# Patient Record
Sex: MALE | Race: WHITE | Employment: UNEMPLOYED | URBAN - METROPOLITAN AREA
[De-identification: names, ages, dates, MRNs, and addresses within clinical notes are randomized per-mention and may not be internally consistent; named-entity substitution may affect disease eponyms.]

---

## 2021-02-02 ENCOUNTER — HOSPITAL ENCOUNTER (OUTPATIENT)
Dept: LAB | Age: 41
Discharge: HOME | End: 2021-02-02
Payer: COMMERCIAL

## 2021-02-02 LAB — POTASSIUM SERPL-SCNC: 3.6 MMOL/L (ref 3.5–5.5)

## 2021-02-02 PROCEDURE — 84132 ASSAY OF SERUM POTASSIUM: CPT

## 2021-02-02 PROCEDURE — 36415 COLL VENOUS BLD VENIPUNCTURE: CPT

## 2022-04-18 ENCOUNTER — HOSPITAL ENCOUNTER (OUTPATIENT)
Dept: LAB | Age: 42
Discharge: HOME | End: 2022-04-18

## 2022-04-18 ENCOUNTER — OFFICE VISIT (OUTPATIENT)
Dept: HEMATOLOGY | Age: 42
End: 2022-04-18

## 2022-04-18 VITALS
HEIGHT: 74 IN | OXYGEN SATURATION: 99 % | SYSTOLIC BLOOD PRESSURE: 152 MMHG | WEIGHT: 224 LBS | TEMPERATURE: 97.1 F | BODY MASS INDEX: 28.75 KG/M2 | DIASTOLIC BLOOD PRESSURE: 103 MMHG

## 2022-04-18 DIAGNOSIS — K70.9 ALCOHOLIC LIVER DISEASE (HCC): Primary | ICD-10-CM

## 2022-04-18 PROBLEM — I10 HYPERTENSION: Status: ACTIVE | Noted: 2022-04-18

## 2022-04-18 PROBLEM — M10.9 GOUT: Status: ACTIVE | Noted: 2022-04-18

## 2022-04-18 LAB
ETHANOL SERPL-MCNC: <3 MG/DL (ref 0–3)
SENTARA SPECIMEN COL,SENBCF: NORMAL

## 2022-04-18 PROCEDURE — 99203 OFFICE O/P NEW LOW 30 MIN: CPT | Performed by: INTERNAL MEDICINE

## 2022-04-18 PROCEDURE — 82077 ASSAY SPEC XCP UR&BREATH IA: CPT

## 2022-04-18 PROCEDURE — 99001 SPECIMEN HANDLING PT-LAB: CPT

## 2022-04-18 RX ORDER — AMLODIPINE BESYLATE 5 MG/1
5 TABLET ORAL DAILY
COMMUNITY

## 2022-04-18 RX ORDER — LISINOPRIL 10 MG/1
10 TABLET ORAL DAILY
COMMUNITY

## 2022-04-18 RX ORDER — DISULFIRAM 250 MG/1
TABLET ORAL
COMMUNITY
Start: 2022-03-30

## 2022-04-18 RX ORDER — COLCHICINE 0.6 MG/1
0.6 TABLET ORAL DAILY
COMMUNITY
End: 2022-04-18

## 2022-04-18 NOTE — Clinical Note
5/7/2022    Patient: Krish Morel   YOB: 1980   Date of Visit: 4/18/2022     Jona Quiroga MD  [facility] 1 [phone]  Via Fax: [phone]     [address] [place] [address]  Via     Dear Jona Quiroga MD  [address] [place] [address],      Thank you for referring Mr. Krish Morel to [address] [place] [address],[address] for evaluation. My notes for this consultation are attached. If you have questions, please do not hesitate to call me. I look forward to following your patient along with you.       Sincerely,    Timmy Simpson MD

## 2022-04-18 NOTE — PROGRESS NOTES
[address] [facility], MD, [address], [place], [place]      Leti Jones, RAJNI Pandey, ACNP-BC     Lainey Calderon, [facility]   GILBERT Link, [facility]       Poonam Hicks [address]    at [address], [address] [place] [address]    [address] [facility] 22. [phone]    FAX: [address] [place] [address]    at [address], [address]    [phone]    FAX: [phone]       Patient Care Team:  Mendy Willson MD as PCP - General (Family Medicine)      Problem List  Date Reviewed: 4/18/2022          Codes Class Noted    Alcoholic liver disease [facility]) ICD-10-CM: K70.9  ICD-9-CM: 571.3  4/18/2022        Hypertension ICD-10-CM: I10  ICD-9-CM: 401.9  4/18/2022        Gout ICD-10-CM: M10.9  ICD-9-CM: 274.9  4/18/2022              The clinicians listed above have asked me to see Tc Josephamara in consultation regarding elevated liver enzymes and its management. All medical records sent by the referring physicians were reviewed including imaging studies     The patient is a 38 y.o.  male who went to the ED when he developed jaundice in 3/2022. TBILI at that time 16. There was a previous visit to the ED in 9/2021 for severe dehydration due to alcohol. TBILI at that time was normal.    The patient had consumed alcohol in excess. The patient has been abstinent from all alcoholic beverages since 0/2324. Serologic evaluation for markers of chronic liver disease was negative for HCV, HBV,     Ultrasound of the liver was performed in 3/2022. The results of the imaging suggested fatty liver disease. The patient does not have any symptoms which could be attributed to the liver disorder. All fatigue has resolved,   He feels better than he hs in over 1 year. Sleeping better. Moved into [address] [place], [place] [address]way [address] and is going to alcohol counseling. The patient is not experiencing the following symptoms which are commonly seen in this liver disorder:   problems concentrating,   swelling of the abdomen,   swelling of the lower extremities,   hematemesis,   hematochezia. The patient completes all daily activities without any functional limitations. ASSESSMENT AND PLAN:  Elevated liver enzymes  Intermittent elevation in liver transaminases that appers to be due to alcohol. Have performed laboratory testing to monitor liver function and degree of liver injury. This included BMP, hepatic panel, CBC with platelet count, INR. Liver transaminases are now normal.  ALP is normal.  Liver function is normal.  The platelet count is normal.      Based upon laboratory studies and imaging the patient does not appear to have advanced liver disease. Serologic testing for causes of chronic liver disease was negative for HCV, HBV     The most likely causes for the liver chemistry abnormalities were discussed with the patient and include alcoholic liver disease. The need to perform an assessment of liver fibrosis was discussed with the patient. The Fibroscan can assess liver fibrosis and determine if a patient has advanced fibrosis or cirrhosis without the need for liver biopsy. This will be performed at the next office visit. If the Fibroscan suggests advanced fibrosis then a liver biopsy should be considered. The Fibroscan can be repeated annually or as often as clinically indicated to assess for fibrosis progression and/or regression. Screening for Hepatocellular Carcinoma  HCC screening is not necessary if the patient has no evidence of cirrhosis. Treatment of other medical problems in patients with chronic liver disease  There are no contraindications for the patient to take most medications that are necessary for treatment of other medical issues.     The patient has recently stopped consuming alcohol. Regular alcohol use increases the risk of toxicity from acetaminophen. This analgesic should be avoided until the patient has been abstinent from alcohol for 6 months. Counseling for alcohol in patients with chronic liver disease  The patient was counseled regarding alcohol consumption and the effect of alcohol on chronic liver disease. The patient has not consumed alcohol since 3/2022    It was recommended that all alcohol consumption be stopped and the patient be abstinent from alcohol for 6 months. If FIbroscan is normal he can resume alcohol but only if he can keep this to a noraml amount which is 0-2 per day. Alcohol should not be consumed every day. Vaccinations   Since the patient does not appear to have a chronic liver disease which can lead to liver injury screening for HAV and HBV is not needed. Routine vaccinations against other bacterial and viral agents can be performed as indicated. Annual flu vaccination should be administered if indicated. ALLERGIES  Not on File    MEDICATIONS  Current Outpatient Medications   Medication Sig    lisinopriL (PRINIVIL, ZESTRIL) 10 mg tablet Take 10 mg by mouth daily.  disulfiram (ANTABUSE) 250 mg tablet     amLODIPine (NORVASC) 5 mg tablet Take 5 mg by mouth daily.  ALLOPURINOL PO Take  by mouth daily. No current facility-administered medications for this visit. SYSTEM REVIEW NOT RELATED TO LIVER DISEASE OR REVIEWED ABOVE:  Constitution systems: Negative for fever, chills, weight gain, weight loss. Eyes: Negative for visual changes. ENT: Negative for sore throat, painful swallowing. Respiratory: Negative for cough, hemoptysis, SOB. Cardiology: Negative for chest pain, palpitations. GI:  Negative for constipation or diarrhea. : Negative for urinary frequency, dysuria, hematuria, nocturia. Skin: Negative for rash. Hematology: Negative for easy bruising, blood clots. Musculo-skelatal: Negative for back pain, muscle pain, weakness. Neurologic: Negative for headaches, dizziness, vertigo, memory problems not related to HE. Psychology: Negative for anxiety, depression. FAMILY HISTORY:  The father Has/had the following following chronic disease(s): None. The mother Has/had the following chronic disease(s): None. There is no family history of liver disease. SOCIAL HISTORY:  The patient has never been . The patient has no children. The patient has never used tobacco products. The patient was has previously consumed alcohol in excess. The patient has been abstinent from alcohol since 3/2022. 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 PHYSICAL EXAMINATION:  Visit Vitals  BP (!) 152/103   Temp 97.1 °F (36.2 °C) (Tympanic)   Ht 6' 2\" (1.88 m)   Wt 224 lb (101.6 kg)   SpO2 99%   BMI 28.76 kg/m²     General: No acute distress. Eyes: Sclera anicteric. ENT: No oral lesions. Thyroid normal.  Nodes: No adenopathy. Skin: No spider angiomata. No jaundice. No palmar erythema. Respiratory: Lungs clear to auscultation. Cardiovascular: Regular heart rate. No murmurs. No JVD. Abdomen: Soft non-tender. Liver size normal to percussion/palpation. Spleen not palpable. No obvious ascites. Extremities: No edema. No muscle wasting. No gross arthritic changes. Neurologic: Alert and oriented. Cranial nerves grossly intact. No asterixis.     LABORATORY STUDIES:  Liver [place] of [address] [place] [address] 4/18/2022   WBC 4.0 - 11.0 K/uL 9.2   ANC 1.8 - 7.7 K/uL 6.3   HGB 13.2 - 17.3 g/dL 14.4    - 440 K/uL 322   INR 0.89 - 1.29 0.92   AST 10 - 37 U/L 16   ALT 5 - 40 U/L 28   Alk Phos 25 - 115 U/L 77   Bili, Total 0.2 - 1.2 mg/dL 0.5   Bili, Direct 0.0 - 0.3 mg/dL <0.2   Albumin 3.5 - 5.0 g/dL 4.7   BUN 6 - 22 mg/dL 17   Creat 0.5 - 1.2 mg/dL 0.8   Na 133 - 145 mmol/L 136   K 3.5 - 5.5 mmol/L 4.1   Cl 98 - 110 mmol/L 100   CO2 20 - 32 mmol/L 23   Glucose 70 - 99 mg/dL 93     SEROLOGIES:  Not available or performed. Testing was performed today. LIVER HISTOLOGY:  Not available or performed    ENDOSCOPIC PROCEDURES:  Not available or performed    RADIOLOGY:  3/2022. Ultrasound of liver. Echogenic consistent with fatty liver. No liver mass lesions. No dilated bile ducts. No ascites. OTHER TESTING:  Not available or performed    FOLLOW-UP:  All of the issues listed above in the Assessment and Plan were discussed with the patient. All questions were answered. The patient expressed a clear understanding of the above. [facility] [place] [address] in 4 weeks for Fibroscan to review all data and determine the treatment plan.       Tad Jaimes MD  [address]  [address] [place] [address], [address] [place] [address], [address]  12 [facility]

## 2022-04-19 LAB
A-G RATIO,AGRAT: 2 RATIO (ref 1.1–2.6)
ABSOLUTE LYMPHOCYTE COUNT, 10803: 2 K/UL (ref 1–4.8)
ALBUMIN SERPL-MCNC: 4.7 G/DL (ref 3.5–5)
ALP SERPL-CCNC: 77 U/L (ref 25–115)
ALT SERPL-CCNC: 28 U/L (ref 5–40)
ANION GAP SERPL CALC-SCNC: 13 MMOL/L (ref 3–15)
AST SERPL W P-5'-P-CCNC: 16 U/L (ref 10–37)
BASOPHILS # BLD: 0.1 K/UL (ref 0–0.2)
BASOPHILS NFR BLD: 1 % (ref 0–2)
BILIRUB SERPL-MCNC: 0.5 MG/DL (ref 0.2–1.2)
BILIRUBIN, DIRECT,CBIL: <0.2 MG/DL (ref 0–0.3)
BUN SERPL-MCNC: 17 MG/DL (ref 6–22)
CALCIUM SERPL-MCNC: 9.3 MG/DL (ref 8.4–10.5)
CHLORIDE SERPL-SCNC: 100 MMOL/L (ref 98–110)
CO2 SERPL-SCNC: 23 MMOL/L (ref 20–32)
CREAT SERPL-MCNC: 0.8 MG/DL (ref 0.5–1.2)
EOSINOPHIL # BLD: 0.2 K/UL (ref 0–0.5)
EOSINOPHIL NFR BLD: 2 % (ref 0–6)
ERYTHROCYTE [DISTWIDTH] IN BLOOD BY AUTOMATED COUNT: 14.6 % (ref 10–15.5)
GFRAA, 66117: >60
GFRNA, 66118: >60
GLOBULIN,GLOB: 2.4 G/DL (ref 2–4)
GLUCOSE SERPL-MCNC: 93 MG/DL (ref 70–99)
GRANULOCYTES,GRANS: 68 % (ref 40–75)
HCT VFR BLD AUTO: 45.4 % (ref 36.6–51.9)
HGB BLD-MCNC: 14.4 G/DL (ref 13.2–17.3)
INR PPP: 0.92 (ref 0.89–1.29)
LYMPHOCYTES, LYMLT: 21 % (ref 20–45)
MCH RBC QN AUTO: 31 PG (ref 26–34)
MCHC RBC AUTO-ENTMCNC: 32 G/DL (ref 31–36)
MCV RBC AUTO: 99 FL (ref 80–95)
MONOCYTES # BLD: 0.6 K/UL (ref 0.1–1)
MONOCYTES NFR BLD: 7 % (ref 3–12)
NEUTROPHILS # BLD AUTO: 6.3 K/UL (ref 1.8–7.7)
PLATELET # BLD AUTO: 322 K/UL (ref 140–440)
PMV BLD AUTO: 10.8 FL (ref 9–13)
POTASSIUM SERPL-SCNC: 4.1 MMOL/L (ref 3.5–5.5)
PROT SERPL-MCNC: 7.1 G/DL (ref 6.4–8.3)
PROTHROMBIN TIME: 10.1 SEC (ref 9–13)
RBC # BLD AUTO: 4.59 M/UL (ref 3.8–5.8)
SODIUM SERPL-SCNC: 136 MMOL/L (ref 133–145)
WBC # BLD AUTO: 9.2 K/UL (ref 4–11)

## 2022-05-16 ENCOUNTER — OFFICE VISIT (OUTPATIENT)
Dept: HEMATOLOGY | Age: 42
End: 2022-05-16
Payer: COMMERCIAL

## 2022-05-16 VITALS
SYSTOLIC BLOOD PRESSURE: 122 MMHG | OXYGEN SATURATION: 98 % | RESPIRATION RATE: 20 BRPM | HEIGHT: 74 IN | WEIGHT: 220 LBS | HEART RATE: 82 BPM | TEMPERATURE: 97.3 F | BODY MASS INDEX: 28.23 KG/M2 | DIASTOLIC BLOOD PRESSURE: 80 MMHG

## 2022-05-16 DIAGNOSIS — K70.9 ALCOHOLIC LIVER DISEASE (HCC): Primary | ICD-10-CM

## 2022-05-16 PROCEDURE — 99214 OFFICE O/P EST MOD 30 MIN: CPT | Performed by: NURSE PRACTITIONER

## 2022-05-16 PROCEDURE — 91200 LIVER ELASTOGRAPHY: CPT | Performed by: NURSE PRACTITIONER

## 2022-05-16 NOTE — PROGRESS NOTES
[address] [facility] [place], [place], Prieto Ortega, Onesimo Toño [facility], [place]      RAJNI Juan, [facility]     April S Yumiko, [facility]   GILBERT Cortes, [facility]       Poonam Hicks [address]    at [facility]    [address] [facility], [address] [facility], [facility].    [phone]    FAX: [address]    [address], [address], [address]    [phone]    FAX: [phone]       Patient Care Team:  Tino Torres MD as PCP - General (Family Medicine)      Problem List  Date Reviewed: 5/16/2022          Codes Class Noted    Alcoholic liver disease [facility]) ICD-10-CM: K70.9  ICD-9-CM: 571.3  4/18/2022        Hypertension ICD-10-CM: I10  ICD-9-CM: 401.9  4/18/2022        Gout ICD-10-CM: M10.9  ICD-9-CM: 274.9  4/18/2022              Jatinder Cantu returns to the [place] [address] today for fibroscan assessment of hepatic fibrosis and for education and management of alcoholic liver disease. The active problem list, all pertinent past medical history, medications, liver histology, endoscopic studies, radiologic findings and laboratory findings related to the liver disorder were reviewed with the patient. The patient is a 38 y.o.  male who went to the ED when he developed jaundice in 3/2022. TBILI at that time 16. There was a previous visit to the ED in 9/2021 for severe dehydration due to alcohol. TBILI at that time was normal.    The patient had consumed alcohol in excess. The patient has been abstinent from all alcoholic beverages since 6/5462. Serologic evaluation for markers of chronic liver disease was negative for HCV, HBV,     Ultrasound of the liver was performed in 3/2022. The results of the imaging suggested fatty liver disease.       The patient has no complaints which can be attributed to liver disease. The patient completes all daily activities without any functional limitations. The patient has not experienced fatigue, fevers, chills, shortness of breath, chest pain, pain in the right side over the liver, diffuse abdominal pain, nausea, vomiting, constipation, diarrhrea, dry eyes, dry mouth, arthralgias, myalgias, yellowing of the eyes or skin, itching, dark urine, problems concentrating, swelling of the abdomen, swelling of the lower extremities, hematemesis, or hematochezia. Since the last office appointment:  Still no alcohol since 03/2022. ASSESSMENT AND PLAN:  Elevated liver enzymes  Intermittent elevation in liver transaminases that appers to be due to alcohol. The most recent laboratory studies indicate that the liver transaminases are normal, alkaline phosphatase is normal, tests of hepatic synthetic and metabolic function are normal, and the platelet count is normal.      Based upon laboratory studies, imaging, and fibroscan assessment, the patient does not appear to have advanced liver disease. Serologic testing for causes of chronic liver disease was negative for HCV, HBV     The most likely causes for the liver chemistry abnormalities were discussed with the patient and include alcoholic liver disease. The need to perform an assessment of liver fibrosis was discussed with the patient. The Fibroscan can assess liver fibrosis and determine if a patient has advanced fibrosis or cirrhosis without the need for liver biopsy. This was performed during this appointment. Results of the scan indicate normal liver stiffness with a suggested Metavir fibrosis score of F0. The scan also suggest that he has a fatty liver. If the Fibroscan suggests advanced fibrosis then a liver biopsy should be considered.       The Fibroscan can be repeated annually or as often as clinically indicated to assess for fibrosis progression and/or regression. Screening for Hepatocellular Carcinoma  HCC screening is not necessary if the patient has no evidence of cirrhosis. Treatment of other medical problems in patients with chronic liver disease  There are no contraindications for the patient to take most medications that are necessary for treatment of other medical issues. The patient has recently stopped consuming alcohol. Regular alcohol use increases the risk of toxicity from acetaminophen. This analgesic should be avoided until the patient has been abstinent from alcohol for 6 months. Counseling for alcohol in patients with chronic liver disease  The patient was counseled regarding alcohol consumption and the effect of alcohol on chronic liver disease. The patient has not consumed alcohol since 3/2022    It was recommended that all alcohol consumption be stopped and the patient be abstinent from alcohol for 6 months. If FIbroscan is normal he can resume alcohol but only if he can keep this to a noraml amount which is 0-3 per day. Alcohol should not be consumed every day. Vaccinations   Since the patient does not appear to have a chronic liver disease which can lead to liver injury screening for HAV and HBV is not needed. Routine vaccinations against other bacterial and viral agents can be performed as indicated. Annual flu vaccination should be administered if indicated. ALLERGIES  No Known Allergies    MEDICATIONS  Current Outpatient Medications   Medication Sig    lisinopriL (PRINIVIL, ZESTRIL) 10 mg tablet Take 10 mg by mouth daily.  disulfiram (ANTABUSE) 250 mg tablet     amLODIPine (NORVASC) 5 mg tablet Take 5 mg by mouth daily.  ALLOPURINOL PO Take  by mouth daily. No current facility-administered medications for this visit. SYSTEM REVIEW NOT RELATED TO LIVER DISEASE OR REVIEWED ABOVE:  Constitution systems: Negative for fever, chills, weight gain, weight loss.    Eyes: Negative for visual changes. ENT: Negative for sore throat, painful swallowing. Respiratory: Negative for cough, hemoptysis, SOB. Cardiology: Negative for chest pain, palpitations. GI:  Negative for constipation or diarrhea. : Negative for urinary frequency, dysuria, hematuria, nocturia. Skin: Negative for rash. Hematology: Negative for easy bruising, blood clots. Musculo-skelatal: Negative for back pain, muscle pain, weakness. Neurologic: Negative for headaches, dizziness, vertigo, memory problems not related to HE. Psychology: Negative for anxiety, depression. FAMILY HISTORY:  The father Has/had the following following chronic disease(s): None. The mother Has/had the following chronic disease(s): None. There is no family history of liver disease. SOCIAL HISTORY:  The patient has never been . The patient has no children. The patient has never used tobacco products. The patient was has previously consumed alcohol in excess. The patient has been abstinent from alcohol since 3/2022. 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 PHYSICAL EXAMINATION:  Visit Vitals  Pulse 82   Temp 97.3 °F (36.3 °C)   Resp 20   Ht 6' 2\" (1.88 m)   Wt 220 lb (99.8 kg)   SpO2 98%   BMI 28.25 kg/m²     General: No acute distress. Eyes: Sclera anicteric. ENT: No oral lesions. Thyroid normal.  Nodes: No adenopathy. Skin: No spider angiomata. No jaundice. No palmar erythema. Respiratory: Lungs clear to auscultation. Cardiovascular: Regular heart rate. No murmurs. No JVD. Abdomen: Soft non-tender. Liver size normal to percussion/palpation. Spleen not palpable. No obvious ascites. Extremities: No edema. No muscle wasting. No gross arthritic changes. Neurologic: Alert and oriented. Cranial nerves grossly intact. No asterixis.     LABORATORY STUDIES:  Liver [place] of [address] [place] [address] & Units 4/18/2022 WBC 4.0 - 11.0 K/uL 9.2   ANC 1.8 - 7.7 K/uL 6.3   HGB 13.2 - 17.3 g/dL 14.4    - 440 K/uL 322   INR 0.89 - 1.29 0.92   AST 10 - 37 U/L 16   ALT 5 - 40 U/L 28   Alk Phos 25 - 115 U/L 77   Bili, Total 0.2 - 1.2 mg/dL 0.5   Bili, Direct 0.0 - 0.3 mg/dL <0.2   Albumin 3.5 - 5.0 g/dL 4.7   BUN 6 - 22 mg/dL 17   Creat 0.5 - 1.2 mg/dL 0.8   Na 133 - 145 mmol/L 136   K 3.5 - 5.5 mmol/L 4.1   Cl 98 - 110 mmol/L 100   CO2 20 - 32 mmol/L 23   Glucose 70 - 99 mg/dL 93     SEROLOGIES:  Not available or performed. Testing was performed today. LIVER HISTOLOGY:  05/2022. [facility]. EkPa was 3.6. IQR/med 11%. . The results suggested a fibrosis level of F0. The CAP score suggests there is hepatic steatosis. ENDOSCOPIC PROCEDURES:  Not available or performed    RADIOLOGY:  3/2022. Ultrasound of liver. Echogenic consistent with fatty liver. No liver mass lesions. No dilated bile ducts. No ascites. OTHER TESTING:  Not available or performed    FOLLOW-UP:  All of the issues listed above in the Assessment and Plan were discussed with the patient. All questions were answered. The patient expressed a clear understanding of the above.     Follow-up Liver [place] of [facility] in one year for repeat [address], [address], [facility] [address]., [address] [facility]   [phone]